# Patient Record
Sex: FEMALE | Race: BLACK OR AFRICAN AMERICAN | NOT HISPANIC OR LATINO | URBAN - METROPOLITAN AREA
[De-identification: names, ages, dates, MRNs, and addresses within clinical notes are randomized per-mention and may not be internally consistent; named-entity substitution may affect disease eponyms.]

---

## 2023-01-04 ENCOUNTER — OFFICE (OUTPATIENT)
Dept: URBAN - METROPOLITAN AREA CLINIC 14 | Facility: CLINIC | Age: 58
End: 2023-01-04

## 2023-01-04 DIAGNOSIS — K21.9 GASTRO-ESOPHAGEAL REFLUX DISEASE WITHOUT ESOPHAGITIS: ICD-10-CM

## 2023-01-04 DIAGNOSIS — Z79.1 LONG TERM (CURRENT) USE OF NON-STEROIDAL ANTI-INFLAMMATORIES: ICD-10-CM

## 2023-01-04 DIAGNOSIS — K92.1 MELENA: ICD-10-CM

## 2023-01-04 DIAGNOSIS — Z86.010 PERSONAL HISTORY OF COLONIC POLYPS: ICD-10-CM

## 2023-01-04 PROCEDURE — 99204 OFFICE O/P NEW MOD 45 MIN: CPT | Performed by: STUDENT IN AN ORGANIZED HEALTH CARE EDUCATION/TRAINING PROGRAM

## 2023-01-04 NOTE — SERVICEHPINOTES
Ms. Karlene Simpson is a 57-year-old  female with past medical history of hyperlipidemia, peripheral neuropathy, GERD, presents to GI clinic today as a referral for black stools and GERD.  Patient reports that she has been experiencing heartburn symptoms for the last 2 years and has been on omeprazole for this reason but she is not taking it correctly.  Sometimes she will take 40 mg after eating or sometimes not until noon.  She also has a prescription for famotidine which she takes sporadically.  She endorses black stools for about 4 weeks but then her stools have now returned to normal color.  She was taking some Pepto-Bismol on occasion but she reports her stools remain dark even after stopping the Pepto-Bismol.  She also endorses some rectal pain for about the last 4 weeks that is episodic, and radiates down her right leg but she is not experiencing it at this time.  She also has been taking ibuprofen daily as well as occasional BC powders for the last couple of weeks.  Her primary care doctor labs show that her hemoglobin is normal at 12.  No family history of GI malignancies or liver disease.  Previous abdominal surgeries include a .  The patient dips tobacco, does not use any marijuana but she does drink a 24 oz beer every day and sometimes more on the weekends.  Her last colonoscopy was in  [place], [place]   In .  She reports that some polyps were removed and she was told to repeat her colonoscopy in 5 years.  She endorses no nausea or vomiting, no abdominal pain, no dysphagia.  No unintentional weight loss or weight gain.  She does not exercise and reports that her acid reflux symptoms are worse when she drinks alcohol.

## 2023-01-04 NOTE — SERVICENOTES
Given the patient's 2 year history of acid reflux, recent melena and NSAID use, we will schedule her for diagnostic EGD   To rule out peptic ulcer disease and malignancy.  I counseled her on avoiding alcohol, tobacco, and recreational drugs.  Fortunately her blood counts are normal based on her PCP lab results.  Instructed her the correct way to take her omeprazole but we will switch her to famotidine for 2 weeks prior to her EGD so that biopsies can be taken for H pylori.   She states that she already has a prescription for famotidine and that she will switch her self accordingly to the date of her procedure. She is currently scheduled for a 5 year follow-up colonoscopy by a GI physician in [facility].  told her to either continue following up there for her next colonoscopy  or I would be happy to do her procedure here in 2026.  Will not set a recall for her at this time for that reason.  I discussed with the patient the planned procedure including the risks and benefits such as bleeding, infection, anesthesia related complications.  Patient agrees to proceed with the planned procedure.

## 2023-02-11 VITALS
SYSTOLIC BLOOD PRESSURE: 147 MMHG | HEIGHT: 59 IN | HEART RATE: 87 BPM | OXYGEN SATURATION: 96 % | DIASTOLIC BLOOD PRESSURE: 87 MMHG

## 2023-02-13 ENCOUNTER — AMBULATORY SURGICAL CENTER (OUTPATIENT)
Dept: URBAN - METROPOLITAN AREA SURGERY 3 | Facility: SURGERY | Age: 58
End: 2023-02-13

## 2023-02-13 ENCOUNTER — OFFICE (OUTPATIENT)
Dept: URBAN - METROPOLITAN AREA PATHOLOGY 22 | Facility: PATHOLOGY | Age: 58
End: 2023-02-13

## 2023-02-13 VITALS
RESPIRATION RATE: 18 BRPM | OXYGEN SATURATION: 98 % | TEMPERATURE: 97.7 F | SYSTOLIC BLOOD PRESSURE: 116 MMHG | DIASTOLIC BLOOD PRESSURE: 83 MMHG | OXYGEN SATURATION: 100 % | OXYGEN SATURATION: 97 % | WEIGHT: 151 LBS | HEIGHT: 59 IN | HEART RATE: 85 BPM | HEART RATE: 92 BPM | RESPIRATION RATE: 20 BRPM | DIASTOLIC BLOOD PRESSURE: 83 MMHG | RESPIRATION RATE: 20 BRPM | OXYGEN SATURATION: 95 % | TEMPERATURE: 97.8 F | DIASTOLIC BLOOD PRESSURE: 86 MMHG | RESPIRATION RATE: 21 BRPM | SYSTOLIC BLOOD PRESSURE: 125 MMHG | DIASTOLIC BLOOD PRESSURE: 82 MMHG | HEART RATE: 85 BPM | HEART RATE: 92 BPM | TEMPERATURE: 97.7 F | HEART RATE: 81 BPM | RESPIRATION RATE: 18 BRPM | SYSTOLIC BLOOD PRESSURE: 125 MMHG | DIASTOLIC BLOOD PRESSURE: 92 MMHG | DIASTOLIC BLOOD PRESSURE: 89 MMHG | HEART RATE: 98 BPM | DIASTOLIC BLOOD PRESSURE: 92 MMHG | RESPIRATION RATE: 20 BRPM | OXYGEN SATURATION: 95 % | SYSTOLIC BLOOD PRESSURE: 128 MMHG | SYSTOLIC BLOOD PRESSURE: 135 MMHG | RESPIRATION RATE: 21 BRPM | SYSTOLIC BLOOD PRESSURE: 143 MMHG | HEART RATE: 81 BPM | RESPIRATION RATE: 21 BRPM | HEART RATE: 82 BPM | DIASTOLIC BLOOD PRESSURE: 89 MMHG | SYSTOLIC BLOOD PRESSURE: 125 MMHG | SYSTOLIC BLOOD PRESSURE: 116 MMHG | HEART RATE: 82 BPM | OXYGEN SATURATION: 98 % | HEART RATE: 98 BPM | TEMPERATURE: 97.8 F | DIASTOLIC BLOOD PRESSURE: 82 MMHG | OXYGEN SATURATION: 98 % | SYSTOLIC BLOOD PRESSURE: 128 MMHG | HEART RATE: 85 BPM | HEART RATE: 82 BPM | WEIGHT: 151 LBS | RESPIRATION RATE: 18 BRPM | DIASTOLIC BLOOD PRESSURE: 83 MMHG | SYSTOLIC BLOOD PRESSURE: 135 MMHG | HEART RATE: 98 BPM | DIASTOLIC BLOOD PRESSURE: 86 MMHG | WEIGHT: 151 LBS | HEIGHT: 59 IN | DIASTOLIC BLOOD PRESSURE: 86 MMHG | OXYGEN SATURATION: 100 % | SYSTOLIC BLOOD PRESSURE: 135 MMHG | DIASTOLIC BLOOD PRESSURE: 89 MMHG | SYSTOLIC BLOOD PRESSURE: 143 MMHG | HEART RATE: 92 BPM | TEMPERATURE: 97.8 F | OXYGEN SATURATION: 97 % | HEIGHT: 59 IN | OXYGEN SATURATION: 95 % | HEART RATE: 81 BPM | OXYGEN SATURATION: 97 % | TEMPERATURE: 97.7 F | DIASTOLIC BLOOD PRESSURE: 82 MMHG | SYSTOLIC BLOOD PRESSURE: 128 MMHG | SYSTOLIC BLOOD PRESSURE: 116 MMHG | DIASTOLIC BLOOD PRESSURE: 92 MMHG | SYSTOLIC BLOOD PRESSURE: 143 MMHG | OXYGEN SATURATION: 100 %

## 2023-02-13 DIAGNOSIS — K92.1 MELENA: ICD-10-CM

## 2023-02-13 DIAGNOSIS — K29.50 UNSPECIFIED CHRONIC GASTRITIS WITHOUT BLEEDING: ICD-10-CM

## 2023-02-13 DIAGNOSIS — K21.9 GASTRO-ESOPHAGEAL REFLUX DISEASE WITHOUT ESOPHAGITIS: ICD-10-CM

## 2023-02-13 PROBLEM — K44.9 DIAPHRAGMATIC HERNIA WITHOUT OBSTRUCTION OR GANGRENE: Status: ACTIVE | Noted: 2023-02-13

## 2023-02-13 PROBLEM — K31.89 OTHER DISEASES OF STOMACH AND DUODENUM: Status: ACTIVE | Noted: 2023-02-13

## 2023-02-13 PROCEDURE — 43239 EGD BIOPSY SINGLE/MULTIPLE: CPT | Performed by: STUDENT IN AN ORGANIZED HEALTH CARE EDUCATION/TRAINING PROGRAM

## 2023-02-13 NOTE — SERVICEHPINOTES
Ms. Karlene Simpson is a 57-year-old  female with past medical history of hyperlipidemia, peripheral neuropathy, GERD, presents to GI lab today for EGD for black stools and GERD.  
br
br Clinic visit 23:
br Patient reports that she has been experiencing heartburn symptoms for the last 2 years and has been on omeprazole for this reason but she is not taking it correctly.  Sometimes she will take 40 mg after eating or sometimes not until noon.  She also has a prescription for famotidine which she takes sporadically.  She endorses black stools for about 4 weeks but then her stools have now returned to normal color.  She was taking some Pepto-Bismol on occasion but she reports her stools remain dark even after stopping the Pepto-Bismol.  She also endorses some rectal pain for about the last 4 weeks that is episodic, and radiates down her right leg but she is not experiencing it at this time.  She also has been taking ibuprofen daily as well as occasional BC powders for the last couple of weeks.  Her primary care doctor labs show that her hemoglobin is normal at 12.  No family history of GI malignancies or liver disease.  Previous abdominal surgeries include a .  The patient dips tobacco, does not use any marijuana but she does drink a 24 oz beer every day and sometimes more on the weekends.  Her last colonoscopy was in  [place], [place]   In .  She reports that some polyps were removed and she was told to repeat her colonoscopy in 5 years.  She endorses no nausea or vomiting, no abdominal pain, no dysphagia.  No unintentional weight loss or weight gain.  She does not exercise and reports that her acid reflux symptoms are worse when she drinks alcohol.
chelsea tran   EGD 23:  
br
No further black stools or abdominal pain, has been off omeprazole for 2 weeks.

## 2023-02-13 NOTE — SERVICEHPINOTES
Ms. Karlene Simpson is a 57-year-old  female with past medical history of hyperlipidemia, peripheral neuropathy, GERD, presents to GI lab today for EGD for black stools and GERD.  
br
br Clinic visit 23:
br Patient reports that she has been experiencing heartburn symptoms for the last 2 years and has been on omeprazole for this reason but she is not taking it correctly.  Sometimes she will take 40 mg after eating or sometimes not until noon.  She also has a prescription for famotidine which she takes sporadically.  She endorses black stools for about 4 weeks but then her stools have now returned to normal color.  She was taking some Pepto-Bismol on occasion but she reports her stools remain dark even after stopping the Pepto-Bismol.  She also endorses some rectal pain for about the last 4 weeks that is episodic, and radiates down her right leg but she is not experiencing it at this time.  She also has been taking ibuprofen daily as well as occasional BC powders for the last couple of weeks.  Her primary care doctor labs show that her hemoglobin is normal at 12.  No family history of GI malignancies or liver disease.  Previous abdominal surgeries include a .  The patient dips tobacco, does not use any marijuana but she does drink a 24 oz beer every day and sometimes more on the weekends.  Her last colonoscopy was in  [place], [place]   In .  She reports that some polyps were removed and she was told to repeat her colonoscopy in 5 years.  She endorses no nausea or vomiting, no abdominal pain, no dysphagia.  No unintentional weight loss or weight gain.  She does not exercise and reports that her acid reflux symptoms are worse when she drinks alcohol.
chelsea tran   EGD 23:  
br
No further black stools or abdominal pain, has been off omeprazole for 2 weeks.

## 2023-02-13 NOTE — SERVICEHPINOTES
Ms. Karlene Simpson is a 57-year-old  female with past medical history of hyperlipidemia, peripheral neuropathy, GERD, presents to GI lab today for EGD for black stools and GERD.  
br
br Clinic visit 23:
br Patient reports that she has been experiencing heartburn symptoms for the last 2 years and has been on omeprazole for this reason but she is not taking it correctly.  Sometimes she will take 40 mg after eating or sometimes not until noon.  She also has a prescription for famotidine which she takes sporadically.  She endorses black stools for about 4 weeks but then her stools have now returned to normal color.  She was taking some Pepto-Bismol on occasion but she reports her stools remain dark even after stopping the Pepto-Bismol.  She also endorses some rectal pain for about the last 4 weeks that is episodic, and radiates down her right leg but she is not experiencing it at this time.  She also has been taking ibuprofen daily as well as occasional BC powders for the last couple of weeks.  Her primary care doctor labs show that her hemoglobin is normal at 12.  No family history of GI malignancies or liver disease.  Previous abdominal surgeries include a .  The patient dips tobacco, does not use any marijuana but she does drink a 24 oz beer every day and sometimes more on the weekends.  Her last colonoscopy was in  [place], [place]   In .  She reports that some polyps were removed and she was told to repeat her colonoscopy in 5 years.  She endorses no nausea or vomiting, no abdominal pain, no dysphagia.  No unintentional weight loss or weight gain.  She does not exercise and reports that her acid reflux symptoms are worse when she drinks alcohol.
chelsea tran   EGD 23:  
br
No further black stools or abdominal pain, has been off omeprazole for 2 weeks.

## 2023-04-05 ENCOUNTER — OFFICE (OUTPATIENT)
Dept: URBAN - METROPOLITAN AREA CLINIC 14 | Facility: CLINIC | Age: 58
End: 2023-04-05

## 2023-04-05 VITALS
WEIGHT: 150 LBS | OXYGEN SATURATION: 95 % | SYSTOLIC BLOOD PRESSURE: 130 MMHG | HEART RATE: 91 BPM | DIASTOLIC BLOOD PRESSURE: 86 MMHG | RESPIRATION RATE: 18 BRPM | HEIGHT: 59 IN

## 2023-04-05 DIAGNOSIS — K62.89 OTHER SPECIFIED DISEASES OF ANUS AND RECTUM: ICD-10-CM

## 2023-04-05 DIAGNOSIS — K21.9 GASTRO-ESOPHAGEAL REFLUX DISEASE WITHOUT ESOPHAGITIS: ICD-10-CM

## 2023-04-05 DIAGNOSIS — A04.8 OTHER SPECIFIED BACTERIAL INTESTINAL INFECTIONS: ICD-10-CM

## 2023-04-05 DIAGNOSIS — Z86.010 PERSONAL HISTORY OF COLONIC POLYPS: ICD-10-CM

## 2023-04-05 PROCEDURE — 99214 OFFICE O/P EST MOD 30 MIN: CPT | Performed by: STUDENT IN AN ORGANIZED HEALTH CARE EDUCATION/TRAINING PROGRAM

## 2023-04-05 RX ORDER — FAMOTIDINE 20 MG/1
TABLET, FILM COATED ORAL
Qty: 28 | Refills: 0 | Status: ACTIVE
Start: 2023-04-05

## 2023-04-05 NOTE — SERVICEHPINOTES
Ms. Karlene Simpson is a 57-year-old  female with past medical history of hyperlipidemia, peripheral neuropathy, GERD, presents to GI clinic for f/u on black stools and GERD. Clinic visit 23:brPatient reports that she has been experiencing heartburn symptoms for the last 2 years and has been on omeprazole for this reason but she is not taking it correctly.  Sometimes she will take 40 mg after eating or sometimes not until noon.  She also has a prescription for famotidine which she takes sporadically.  She endorses black stools for about 4 weeks but then her stools have now returned to normal color.  She was taking some Pepto-Bismol on occasion but she reports her stools remain dark even after stopping the Pepto-Bismol.  She also endorses some rectal pain for about the last 4 weeks that is episodic, and radiates down her right leg but she is not experiencing it at this time.  She also has been taking ibuprofen daily as well as occasional BC powders for the last couple of weeks.  Her primary care doctor labs show that her hemoglobin is normal at 12.  No family history of GI malignancies or liver disease.  Previous abdominal surgeries include a .  The patient dips tobacco, does not use any marijuana but she does drink a 24 oz beer every day and sometimes more on the weekends.  Her last colonoscopy was in  [place], [place] In .  She reports that some polyps were removed and she was told to repeat her colonoscopy in 5 years.  She endorses no nausea or vomiting, no abdominal pain, no dysphagia.  No unintentional weight loss or weight gain.  She does not exercise and reports that her acid reflux symptoms are worse when she drinks alcohol.EGD 23:brNo further black stools or abdominal pain, has been off omeprazole for 2 weeks.
br
br    Clinic visit 23:
br 
  The patient's abdominal pain and acid reflux has improved since she has been on omeprazole and since being treated for H pylori eye.  She completed a 2 week course of antibiotics as prescribed.  She has not yet been checked for H pylori eradication.  She endorses having some rectal pain now for the last 2 months.  It happens about 4-5 times per month, causes about 5/10 on the pain scale, last for about 20 minutes.  No perianal drainage or bleeding.  It is not associated with the passage of stool.  She has tried over-the-counter suppositories which did not help.  She reports it is worse with walking.  Her last colonoscopy was in  as mentioned above. che[place]

## 2023-04-05 NOTE — SERVICENOTES
The patient's rectal exam did not show any obvious fissures or perianal disease so it is likely that she has some proctalgia fugax.  I am going to treat her empirically for that and also going to request her colonoscopy records that she had done in 2021 since she reports she would like to continue coming here for her GI care.  I went ahead and set a recall for her next colonoscopy in 5 years based on the history she gives me of what her GI physicians in [place] told her.  We may adjust this based on the results that I get from her records.   I sent a prescription in for topical diltiazem cream that she can use as needed for her rectal pain.  Encouraged her to use a high-fiber diet.  She will switch over from omeprazole to famotidine for 2 weeks and then bring a stool sample with her to her next appointment to see me to ensure that the H pylori as a retic aided.  All questions addressed.

## 2023-08-30 ENCOUNTER — OFFICE (OUTPATIENT)
Dept: URBAN - METROPOLITAN AREA CLINIC 14 | Facility: CLINIC | Age: 58
End: 2023-08-30

## 2023-08-30 VITALS
HEIGHT: 59 IN | WEIGHT: 149 LBS | OXYGEN SATURATION: 92 % | SYSTOLIC BLOOD PRESSURE: 145 MMHG | DIASTOLIC BLOOD PRESSURE: 85 MMHG | HEART RATE: 86 BPM

## 2023-08-30 DIAGNOSIS — R14.0 ABDOMINAL DISTENSION (GASEOUS): ICD-10-CM

## 2023-08-30 DIAGNOSIS — K62.89 OTHER SPECIFIED DISEASES OF ANUS AND RECTUM: ICD-10-CM

## 2023-08-30 DIAGNOSIS — K21.9 GASTRO-ESOPHAGEAL REFLUX DISEASE WITHOUT ESOPHAGITIS: ICD-10-CM

## 2023-08-30 DIAGNOSIS — K29.50 UNSPECIFIED CHRONIC GASTRITIS WITHOUT BLEEDING: ICD-10-CM

## 2023-08-30 PROCEDURE — 99213 OFFICE O/P EST LOW 20 MIN: CPT | Performed by: STUDENT IN AN ORGANIZED HEALTH CARE EDUCATION/TRAINING PROGRAM

## 2023-08-30 NOTE — SERVICENOTES
Patient overall is doing very well.  Her anorectal pain has resolved.  She is off the omeprazole and only using famotidine as needed.  She brought her stool sample to be tested for H pylori today.  She has been off of PPI for at least 2 weeks.  Her next colonoscopy set 04/2026.  for her bloating I encouraged her to use a daily MiraLax supplement to see if this can help with some of her hard stools and then she can follow up with us in 6 months or sooner if needed.  I personally reviewed her previous medical records for visit today, continue to avoid NSAIDs.  If her H pylori infection is persistent then we may treat her with another course of antibiotics.

## 2023-08-30 NOTE — SERVICEHPINOTES
Ms. Karlene Simpson is a 58-year-old  female with past medical history of hyperlipidemia, peripheral neuropathy, GERD, initially presented to GI clinic for f/u on black stools and GERD. Clinic visit 23:brPatient reports that she has been experiencing heartburn symptoms for the last 2 years and has been on omeprazole for this reason but she is not taking it correctly.  Sometimes she will take 40 mg after eating or sometimes not until noon.  She also has a prescription for famotidine which she takes sporadically.  She endorses black stools for about 4 weeks but then her stools have now returned to normal color.  She was taking some Pepto-Bismol on occasion but she reports her stools remain dark even after stopping the Pepto-Bismol.  She also endorses some rectal pain for about the last 4 weeks that is episodic, and radiates down her right leg but she is not experiencing it at this time.  She also has been taking ibuprofen daily as well as occasional BC powders for the last couple of weeks.  Her primary care doctor labs show that her hemoglobin is normal at 12.  No family history of GI malignancies or liver disease.  Previous abdominal surgeries include a .  The patient dips tobacco, does not use any marijuana but she does drink a 24 oz beer every day and sometimes more on the weekends.  Her last colonoscopy was in  [place], [place] In .  She reports that some polyps were removed and she was told to repeat her colonoscopy in 5 years.  She endorses no nausea or vomiting, no abdominal pain, no dysphagia.  No unintentional weight loss or weight gain.  She does not exercise and reports that her acid reflux symptoms are worse when she drinks alcohol.EGD 23:brNo further black stools or abdominal pain, has been off omeprazole for 2 weeks.Clinic visit 23:br  The patient's abdominal pain and acid reflux has improved since she has been on omeprazole and since being treated for H pylori eye.  She completed a 2 week course of antibiotics as prescribed.  She has not yet been checked for H pylori eradication.  She endorses having some rectal pain now for the last 2 months.  It happens about 4-5 times per month, causes about 5/10 on the pain scale, last for about 20 minutes.  No perianal drainage or bleeding.  It is not associated with the passage of stool.  She has tried over-the-counter suppositories which did not help.  She reports it is worse with walking.  Her last colonoscopy was in  as mentioned above. 
br
br   Clinic visit 23:
br 
  Patient overall is doing well.  Her anorectal pain has improved with the topical diltiazem and she has not had any those symptoms in the last 1 month.  She is off of omeprazole now and is only using famotidine as needed.  She brought a stool sample today to be tested for H pylori.  She is not taking any NSAIDs.  Having about 1 bowel movement per day but sometimes it is hard.  She is not using any stool softeners.  She has no dysphagia or bright red blood per rectum.  Her last colonoscopy report was reviewed since last visit and it appears her colonoscopy in  was normal but she does have a history of adenomatous polyps and so with the assumption that they were advanced on her last colonoscopy we kept her recall interval at 5 years so recall is set at  .  Patient is continuing to work on weight loss with her PCP.

## 2023-09-02 LAB — H. PYLORI STOOL AG, EIA: NEGATIVE

## 2024-06-19 ENCOUNTER — OFFICE (OUTPATIENT)
Dept: URBAN - METROPOLITAN AREA CLINIC 11 | Facility: CLINIC | Age: 59
End: 2024-06-19
Payer: COMMERCIAL

## 2024-06-19 VITALS
HEIGHT: 59 IN | HEART RATE: 94 BPM | SYSTOLIC BLOOD PRESSURE: 188 MMHG | WEIGHT: 152 LBS | DIASTOLIC BLOOD PRESSURE: 101 MMHG | OXYGEN SATURATION: 99 %

## 2024-06-19 DIAGNOSIS — K21.9 GASTRO-ESOPHAGEAL REFLUX DISEASE WITHOUT ESOPHAGITIS: ICD-10-CM

## 2024-06-19 DIAGNOSIS — K59.4 ANAL SPASM: ICD-10-CM

## 2024-06-19 DIAGNOSIS — K59.00 CONSTIPATION, UNSPECIFIED: ICD-10-CM

## 2024-06-19 PROCEDURE — 99214 OFFICE O/P EST MOD 30 MIN: CPT | Performed by: STUDENT IN AN ORGANIZED HEALTH CARE EDUCATION/TRAINING PROGRAM

## 2024-06-19 RX ORDER — FAMOTIDINE 20 MG/1
TABLET, FILM COATED ORAL
Qty: 60 | Refills: 6 | Status: ACTIVE
Start: 2024-06-19

## 2024-06-19 NOTE — SERVICEHPINOTES
Ms. Karlene Simpson is a 59-year-old  female with past medical history of hyperlipidemia, peripheral neuropathy, GERD, initially presented to GI clinic for f/u on black stools and GERD. Clinic visit 23:brPatient reports that she has been experiencing heartburn symptoms for the last 2 years and has been on omeprazole for this reason but she is not taking it correctly.  Sometimes she will take 40 mg after eating or sometimes not until noon.  She also has a prescription for famotidine which she takes sporadically.  She endorses black stools for about 4 weeks but then her stools have now returned to normal color.  She was taking some Pepto-Bismol on occasion but she reports her stools remain dark even after stopping the Pepto-Bismol.  She also endorses some rectal pain for about the last 4 weeks that is episodic, and radiates down her right leg but she is not experiencing it at this time.  She also has been taking ibuprofen daily as well as occasional BC powders for the last couple of weeks.  Her primary care doctor labs show that her hemoglobin is normal at 12.  No family history of GI malignancies or liver disease.  Previous abdominal surgeries include a .  The patient dips tobacco, does not use any marijuana but she does drink a 24 oz beer every day and sometimes more on the weekends.  Her last colonoscopy was in  [place], [place] In .  She reports that some polyps were removed and she was told to repeat her colonoscopy in 5 years.  She endorses no nausea or vomiting, no abdominal pain, no dysphagia.  No unintentional weight loss or weight gain.  She does not exercise and reports that her acid reflux symptoms are worse when she drinks alcohol.EGD 23:brNo further black stools or abdominal pain, has been off omeprazole for 2 weeks.Clinic visit 23:br  The patient's abdominal pain and acid reflux has improved since she has been on omeprazole and since being treated for H pylori eye.  She completed a 2 week course of antibiotics as prescribed.  She has not yet been checked for H pylori eradication.  She endorses having some rectal pain now for the last 2 months.  It happens about 4-5 times per month, causes about 5/10 on the pain scale, last for about 20 minutes.  No perianal drainage or bleeding.  It is not associated with the passage of stool.  She has tried over-the-counter suppositories which did not help.  She reports it is worse with walking.  Her last colonoscopy was in  as mentioned above. Clinic visit 23:br  Patient overall is doing well.  Her anorectal pain has improved with the topical diltiazem and she has not had any those symptoms in the last 1 month.  She is off of omeprazole now and is only using famotidine as needed.  She brought a stool sample today to be tested for H pylori.  She is not taking any NSAIDs.  Having about 1 bowel movement per day but sometimes it is hard.  She is not using any stool softeners.  She has no dysphagia or bright red blood per rectum.  Her last colonoscopy report was reviewed since last visit and it appears her colonoscopy in  was normal but she does have a history of adenomatous polyps and so with the assumption that they were advanced on her last colonoscopy we kept her recall interval at 5 years so recall is set at  .  Patient is continuing to work on weight loss with her PCP.
chelsea
br     Clinic visit 2024:
br 
 patient overall is doing well today.  She still has some anorectal pain 2 times per week or so.  She was using topical diltiazem which did have good relief of her symptoms.  We will refill this again today.  She is using famotidine as needed.  Weight has been stable.  Recall colonoscopy set for .  She has no other new issues today.